# Patient Record
Sex: FEMALE | Race: BLACK OR AFRICAN AMERICAN | Employment: UNEMPLOYED | URBAN - METROPOLITAN AREA
[De-identification: names, ages, dates, MRNs, and addresses within clinical notes are randomized per-mention and may not be internally consistent; named-entity substitution may affect disease eponyms.]

---
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
## 2024-02-10 ENCOUNTER — HOSPITAL ENCOUNTER (EMERGENCY)
Facility: HOSPITAL | Age: 19
Discharge: HOME | End: 2024-02-10
Attending: FAMILY MEDICINE
Payer: MEDICAID

## 2024-02-10 VITALS
SYSTOLIC BLOOD PRESSURE: 140 MMHG | TEMPERATURE: 98.6 F | RESPIRATION RATE: 20 BRPM | BODY MASS INDEX: 51.91 KG/M2 | DIASTOLIC BLOOD PRESSURE: 85 MMHG | HEIGHT: 63 IN | WEIGHT: 293 LBS | HEART RATE: 97 BPM | OXYGEN SATURATION: 100 %

## 2024-02-10 DIAGNOSIS — J02.9 ACUTE PHARYNGITIS, UNSPECIFIED ETIOLOGY: Primary | ICD-10-CM

## 2024-02-10 DIAGNOSIS — R59.0 SUBMANDIBULAR LYMPHADENOPATHY: ICD-10-CM

## 2024-02-10 PROCEDURE — 99283 EMERGENCY DEPT VISIT LOW MDM: CPT

## 2024-02-10 RX ORDER — AMOXICILLIN 500 MG/1
500 TABLET, FILM COATED ORAL 2 TIMES DAILY
Qty: 20 TABLET | Refills: 0 | Status: SHIPPED | OUTPATIENT
Start: 2024-02-10 | End: 2024-02-20

## 2024-02-10 RX ORDER — IBUPROFEN 600 MG/1
600 TABLET ORAL EVERY 8 HOURS PRN
Qty: 12 TABLET | Refills: 0 | Status: SHIPPED | OUTPATIENT
Start: 2024-02-10 | End: 2024-02-15

## 2024-02-10 ASSESSMENT — PAIN - FUNCTIONAL ASSESSMENT: PAIN_FUNCTIONAL_ASSESSMENT: NONE - DENIES PAIN

## 2024-02-10 NOTE — ED PROVIDER NOTES
EMERGENCY DEPARTMENT HISTORY AND PHYSICAL EXAM      Date: 2/10/2024  Patient Name: Jose Juan Arciniega    History of Presenting Illness         History Provided By:     HPI: Jose Juan Arciniega, is a very pleasant 18 y.o. female presenting to the ED with a chief complaint of sore throat and swollen lymph node.  No radiation of pain.  Recent onset of symptoms..  No difficulty swallowing.  Tolerating secretions with ease.  No anterior neck pain nor swelling.  No changes in voice.  No fevers, chills nor rigors.  No alleviating factors.  Denies any other associated symptoms other than swollen lymph nodes under her left jaw.    There are no other complaints, changes, or physical findings at this time.    PCP: Isrrael Pickard MD    No current facility-administered medications on file prior to encounter.     Current Outpatient Medications on File Prior to Encounter   Medication Sig Dispense Refill    albuterol sulfate HFA (VENTOLIN HFA) 108 (90 Base) MCG/ACT inhaler Inhale 2 puffs into the lungs 4 times daily as needed for Wheezing 18 g 0    loratadine (CLARITIN) 10 MG tablet Take 1 tablet by mouth daily 30 tablet 0       Past History     Past Medical History:  No past medical history on file.    Past Surgical History:  No past surgical history on file.    Family History:  No family history on file.    Social History:  Social History     Tobacco Use    Smoking status: Never    Smokeless tobacco: Never   Substance Use Topics    Alcohol use: Not Currently    Drug use: Never       Allergies:  No Known Allergies      Review of Systems   Review of Systems   Negative unless otherwise stated in HPI  Physical Exam   Physical Exam  Constitutional:       General: Not in acute distress.     Appearance: Normal appearance. Non toxic-appearing.   HENT:      Head: Normocephalic and atraumatic.      Right Ear: External ear normal.      Left Ear: External ear normal.      Mouth/Throat: Posterior oropharynx is erythematous, no tonsillar swelling        Attestations:    Enrique Torres,     Please note that this dictation was completed with [employer], the computer voice recognition software.  Quite often unanticipated grammatical, syntax, homophones, and other interpretive errors are inadvertently transcribed by the computer software.  Please disregard these errors.  Please excuse any errors that have escaped final proofreading.  Thank you.         Enrique Torres DO  02/10/24 [id number]

## 2024-02-10 NOTE — ED TRIAGE NOTES
Reports Lt side of throat is sore - swollen - started yesterday. Denies ear pain. Nonproductive cough. Tonsils still present.

## 2025-04-25 ENCOUNTER — TELEPHONE (OUTPATIENT)
Age: 20
End: 2025-04-25

## 2025-04-25 NOTE — TELEPHONE ENCOUNTER
I tried to call patient today regarding referral for bariatric surgery. I spoke with her mom and explained the process to her. She was  unable to give me her email address. She asked that I call back in an hour.

## 2025-04-29 ENCOUNTER — TELEPHONE (OUTPATIENT)
Age: 20
End: 2025-04-29

## 2025-04-29 NOTE — TELEPHONE ENCOUNTER
I tried to call patient again today regarding bariatric weight loss surgery. I left her a message to return my call. I also stated that I needed her email address.

## 2025-05-22 ENCOUNTER — TELEPHONE (OUTPATIENT)
Age: 20
End: 2025-05-22

## 2025-05-22 NOTE — TELEPHONE ENCOUNTER
Sent new patient bariatric paperwork to email provided on consultation form    Bariatric Episode Created